# Patient Record
Sex: FEMALE
[De-identification: names, ages, dates, MRNs, and addresses within clinical notes are randomized per-mention and may not be internally consistent; named-entity substitution may affect disease eponyms.]

---

## 2018-12-16 ENCOUNTER — HOSPITAL ENCOUNTER (EMERGENCY)
Dept: HOSPITAL 92 - SCSER | Age: 48
Discharge: HOME | End: 2018-12-16
Payer: COMMERCIAL

## 2018-12-16 DIAGNOSIS — R10.816: ICD-10-CM

## 2018-12-16 DIAGNOSIS — E03.9: ICD-10-CM

## 2018-12-16 DIAGNOSIS — R10.12: Primary | ICD-10-CM

## 2018-12-16 DIAGNOSIS — Z79.899: ICD-10-CM

## 2018-12-16 LAB
ALBUMIN SERPL BCG-MCNC: 4.5 G/DL (ref 3.5–5)
ALP SERPL-CCNC: 61 U/L (ref 40–150)
ALT SERPL W P-5'-P-CCNC: 18 U/L (ref 8–55)
ANION GAP SERPL CALC-SCNC: 16 MMOL/L (ref 10–20)
AST SERPL-CCNC: 32 U/L (ref 5–34)
BILIRUB SERPL-MCNC: 0.2 MG/DL (ref 0.2–1.2)
BUN SERPL-MCNC: 8 MG/DL (ref 7–18.7)
CALCIUM SERPL-MCNC: 9.2 MG/DL (ref 7.8–10.44)
CHLORIDE SERPL-SCNC: 114 MMOL/L (ref 98–107)
CO2 SERPL-SCNC: 19 MMOL/L (ref 22–29)
CREAT CL PREDICTED SERPL C-G-VRATE: 0 ML/MIN (ref 70–130)
GLOBULIN SER CALC-MCNC: 3.3 G/DL (ref 2.4–3.5)
GLUCOSE SERPL-MCNC: 94 MG/DL (ref 70–105)
HGB BLD-MCNC: 14.6 G/DL (ref 12–16)
LIPASE SERPL-CCNC: 42 U/L (ref 8–78)
MCH RBC QN AUTO: 30.2 PG (ref 27–31)
MCV RBC AUTO: 90.4 FL (ref 78–98)
MDIFF COMPLETE?: YES
PLATELET # BLD AUTO: 334 THOU/UL (ref 130–400)
POTASSIUM SERPL-SCNC: 3.6 MMOL/L (ref 3.5–5.1)
RBC # BLD AUTO: 4.83 MILL/UL (ref 4.2–5.4)
SODIUM SERPL-SCNC: 145 MMOL/L (ref 136–145)
WBC # BLD AUTO: 20 THOU/UL (ref 4.8–10.8)

## 2018-12-16 PROCEDURE — S0028 INJECTION, FAMOTIDINE, 20 MG: HCPCS

## 2018-12-16 PROCEDURE — 93005 ELECTROCARDIOGRAM TRACING: CPT

## 2018-12-16 PROCEDURE — 96374 THER/PROPH/DIAG INJ IV PUSH: CPT

## 2018-12-16 PROCEDURE — 85025 COMPLETE CBC W/AUTO DIFF WBC: CPT

## 2018-12-16 PROCEDURE — 80053 COMPREHEN METABOLIC PANEL: CPT

## 2018-12-16 PROCEDURE — 96361 HYDRATE IV INFUSION ADD-ON: CPT

## 2018-12-16 PROCEDURE — 96375 TX/PRO/DX INJ NEW DRUG ADDON: CPT

## 2018-12-16 PROCEDURE — 74177 CT ABD & PELVIS W/CONTRAST: CPT

## 2018-12-16 PROCEDURE — 83690 ASSAY OF LIPASE: CPT

## 2018-12-16 NOTE — CT
PRELIMINARY REPORT/[facility]/EMERGENTY AFTER-HOURS PROCEDURE 

 

CT Abdomen and Pelvis With Contrast

 

EXAM DATE/TIME:

12/16/2018 1:20 AM

 

CLINICAL HISTORY:

48 years old, female; Pain; Abdominal pain; Patient HX: Patient reports she was drinking with family 
tonight when she went to the restroom and had sharp bilateral upper abd pain. Patient reports she had
 difficulty getting up from the toilet due to the pain. Patient reports one episode of vomiting.

Patient reports extensive abd surgical history.

 

TECHNIQUE:

Axial computed tomography images of the abdomen and pelvis with intravenous contrast. Coronal reforma
tted images were created and reviewed.

 

CONTRAST:

85 ml of ECG725 administered intravenously.

 

COMPARISON:

No relevant prior studies available.

 

FINDINGS:

Lower thorax: No acute findings.

 

ABDOMEN:

Liver: No acute findings. No mass.

Gallbladder and bile ducts: Prior cholecystectomy.

Pancreas: No acute findings. No ductal dilation.

Spleen: Prior splenectomy.

Adrenals: No acute findings. No mass.

Kidneys and ureters: No acute findings. No mass. No hydronephrosis.

Stomach and bowel: No evidence of bowel obstruction. Diverticulosis. Prior gastric surgery.

Appendix: No evidence of appendicitis.

 

PELVIS:

Bladder: No acute findings.

Reproductive: Right ovarian 3.2 cm probable cyst.

 

ABDOMEN and PELVIS:

Intraperitoneal space: No free air. No significant fluid collection.

Bones/joints: No acute fracture.

Soft tissues: No acute findings.

Vasculature: No acute findings. No abdominal aortic aneurysm.

Lymph nodes: No significant lymphadenopathy.

 

IMPRESSION:

No acute findings.

 

Right ovarian probable cyst; further evaluation/followup can be performed with ultrasound.

 

Thank you for allowing us to participate in the care of your patient.

Dictated and Authenticated by: Alverto Isaacs MD

12/16/2018 3:01 AM Central Time ([place] & [place])

 

 

 

FINAL REPORT 

 

EMERGENCY AFTER HOURS STUDY

 

CT ABDOMEN WITH CONTRAST

CT PELVIS WITH CONTRAST:

 

DATE: 12/16/2018.

TIME: 1:33 a.m.

 

HISTORY:

A 48-year-old female with acute bilateral upper quadrant abdominal pain and vomiting.

 

FINDINGS:

This report agrees with preliminary report by [facility].

 

IMPRESSION:

1.  Approximately 3.5 cm right adnexal cystic lesion.

2.  Otherwise, no acute findings.

3.  Status post cholecystectomy, splenectomy, gastric surgery, and hysterectomy.

 

 

COLT PATTEN

 

POS: [place]